# Patient Record
Sex: MALE | Race: WHITE | Employment: FULL TIME | URBAN - METROPOLITAN AREA
[De-identification: names, ages, dates, MRNs, and addresses within clinical notes are randomized per-mention and may not be internally consistent; named-entity substitution may affect disease eponyms.]

---

## 2017-03-13 PROBLEM — M25.552 LEFT HIP PAIN: Status: ACTIVE | Noted: 2017-03-13

## 2020-12-29 ENCOUNTER — HOSPITAL ENCOUNTER (EMERGENCY)
Age: 25
Discharge: HOME | End: 2020-12-29
Attending: EMERGENCY MEDICINE
Payer: COMMERCIAL

## 2020-12-29 ENCOUNTER — APPOINTMENT (OUTPATIENT)
Dept: GENERAL RADIOLOGY | Age: 25
End: 2020-12-29
Payer: COMMERCIAL

## 2020-12-29 ENCOUNTER — NURSE TRIAGE (OUTPATIENT)
Dept: OTHER | Facility: CLINIC | Age: 25
End: 2020-12-29

## 2020-12-29 ENCOUNTER — APPOINTMENT (OUTPATIENT)
Dept: CT IMAGING | Age: 25
End: 2020-12-29
Payer: COMMERCIAL

## 2020-12-29 VITALS
RESPIRATION RATE: 18 BRPM | TEMPERATURE: 98 F | HEIGHT: 67 IN | SYSTOLIC BLOOD PRESSURE: 138 MMHG | BODY MASS INDEX: 22.76 KG/M2 | DIASTOLIC BLOOD PRESSURE: 85 MMHG | HEART RATE: 99 BPM | WEIGHT: 145 LBS | OXYGEN SATURATION: 98 %

## 2020-12-29 DIAGNOSIS — Z20.822 SUSPECTED COVID-19 VIRUS INFECTION: Primary | ICD-10-CM

## 2020-12-29 DIAGNOSIS — R19.7 DIARRHEA, UNSPECIFIED TYPE: ICD-10-CM

## 2020-12-29 LAB
ALBUMIN SERPL-MCNC: 5.4 G/DL (ref 3.5–5.2)
ALP BLD-CCNC: 101 U/L (ref 40–129)
ALT SERPL-CCNC: 21 U/L (ref 0–40)
ANION GAP SERPL CALCULATED.3IONS-SCNC: 15 MMOL/L (ref 7–16)
AST SERPL-CCNC: 20 U/L (ref 0–39)
BASOPHILS ABSOLUTE: 0.02 E9/L (ref 0–0.2)
BASOPHILS RELATIVE PERCENT: 0.4 % (ref 0–2)
BILIRUB SERPL-MCNC: 1.8 MG/DL (ref 0–1.2)
BUN BLDV-MCNC: 8 MG/DL (ref 6–20)
CALCIUM SERPL-MCNC: 10.4 MG/DL (ref 8.6–10.2)
CHLORIDE BLD-SCNC: 103 MMOL/L (ref 98–107)
CO2: 21 MMOL/L (ref 22–29)
CREAT SERPL-MCNC: 1 MG/DL (ref 0.7–1.2)
D DIMER: 992 NG/ML DDU
EKG ATRIAL RATE: 84 BPM
EKG P AXIS: 51 DEGREES
EKG P-R INTERVAL: 164 MS
EKG Q-T INTERVAL: 306 MS
EKG QRS DURATION: 86 MS
EKG QTC CALCULATION (BAZETT): 361 MS
EKG R AXIS: 91 DEGREES
EKG T AXIS: 69 DEGREES
EKG VENTRICULAR RATE: 84 BPM
EOSINOPHILS ABSOLUTE: 0.01 E9/L (ref 0.05–0.5)
EOSINOPHILS RELATIVE PERCENT: 0.2 % (ref 0–6)
GFR AFRICAN AMERICAN: >60
GFR NON-AFRICAN AMERICAN: >60 ML/MIN/1.73
GLUCOSE BLD-MCNC: 103 MG/DL (ref 74–99)
HCT VFR BLD CALC: 48.4 % (ref 37–54)
HEMOGLOBIN: 17.4 G/DL (ref 12.5–16.5)
IMMATURE GRANULOCYTES #: 0 E9/L
IMMATURE GRANULOCYTES %: 0 % (ref 0–5)
INR BLD: 1
LYMPHOCYTES ABSOLUTE: 1.03 E9/L (ref 1.5–4)
LYMPHOCYTES RELATIVE PERCENT: 23.1 % (ref 20–42)
MCH RBC QN AUTO: 28.9 PG (ref 26–35)
MCHC RBC AUTO-ENTMCNC: 36 % (ref 32–34.5)
MCV RBC AUTO: 80.4 FL (ref 80–99.9)
MONOCYTES ABSOLUTE: 0.43 E9/L (ref 0.1–0.95)
MONOCYTES RELATIVE PERCENT: 9.7 % (ref 2–12)
NEUTROPHILS ABSOLUTE: 2.96 E9/L (ref 1.8–7.3)
NEUTROPHILS RELATIVE PERCENT: 66.6 % (ref 43–80)
PDW BLD-RTO: 11.7 FL (ref 11.5–15)
PLATELET # BLD: 284 E9/L (ref 130–450)
PMV BLD AUTO: 9.8 FL (ref 7–12)
POTASSIUM REFLEX MAGNESIUM: 3.6 MMOL/L (ref 3.5–5)
PRO-BNP: 7 PG/ML (ref 0–125)
PROTHROMBIN TIME: 11.9 SEC (ref 9.3–12.4)
RBC # BLD: 6.02 E12/L (ref 3.8–5.8)
SODIUM BLD-SCNC: 139 MMOL/L (ref 132–146)
TOTAL PROTEIN: 8.3 G/DL (ref 6.4–8.3)
TROPONIN: <0.01 NG/ML (ref 0–0.03)
WBC # BLD: 4.5 E9/L (ref 4.5–11.5)

## 2020-12-29 PROCEDURE — 85025 COMPLETE CBC W/AUTO DIFF WBC: CPT

## 2020-12-29 PROCEDURE — 80053 COMPREHEN METABOLIC PANEL: CPT

## 2020-12-29 PROCEDURE — 6360000004 HC RX CONTRAST MEDICATION: Performed by: RADIOLOGY

## 2020-12-29 PROCEDURE — 93005 ELECTROCARDIOGRAM TRACING: CPT | Performed by: EMERGENCY MEDICINE

## 2020-12-29 PROCEDURE — 2580000003 HC RX 258: Performed by: RADIOLOGY

## 2020-12-29 PROCEDURE — 85378 FIBRIN DEGRADE SEMIQUANT: CPT

## 2020-12-29 PROCEDURE — 6370000000 HC RX 637 (ALT 250 FOR IP): Performed by: STUDENT IN AN ORGANIZED HEALTH CARE EDUCATION/TRAINING PROGRAM

## 2020-12-29 PROCEDURE — 99285 EMERGENCY DEPT VISIT HI MDM: CPT

## 2020-12-29 PROCEDURE — 83880 ASSAY OF NATRIURETIC PEPTIDE: CPT

## 2020-12-29 PROCEDURE — 2580000003 HC RX 258: Performed by: EMERGENCY MEDICINE

## 2020-12-29 PROCEDURE — 71275 CT ANGIOGRAPHY CHEST: CPT

## 2020-12-29 PROCEDURE — U0003 INFECTIOUS AGENT DETECTION BY NUCLEIC ACID (DNA OR RNA); SEVERE ACUTE RESPIRATORY SYNDROME CORONAVIRUS 2 (SARS-COV-2) (CORONAVIRUS DISEASE [COVID-19]), AMPLIFIED PROBE TECHNIQUE, MAKING USE OF HIGH THROUGHPUT TECHNOLOGIES AS DESCRIBED BY CMS-2020-01-R: HCPCS

## 2020-12-29 PROCEDURE — 85610 PROTHROMBIN TIME: CPT

## 2020-12-29 PROCEDURE — 71045 X-RAY EXAM CHEST 1 VIEW: CPT

## 2020-12-29 PROCEDURE — 84484 ASSAY OF TROPONIN QUANT: CPT

## 2020-12-29 RX ORDER — ALBUTEROL SULFATE 90 UG/1
2 AEROSOL, METERED RESPIRATORY (INHALATION) EVERY 4 HOURS PRN
Qty: 1 INHALER | Refills: 1 | Status: SHIPPED | OUTPATIENT
Start: 2020-12-29 | End: 2021-12-29

## 2020-12-29 RX ORDER — METHYLPREDNISOLONE 4 MG/1
TABLET ORAL
Qty: 1 KIT | Status: SHIPPED | OUTPATIENT
Start: 2020-12-29 | End: 2021-01-04

## 2020-12-29 RX ORDER — 0.9 % SODIUM CHLORIDE 0.9 %
1000 INTRAVENOUS SOLUTION INTRAVENOUS ONCE
Status: COMPLETED | OUTPATIENT
Start: 2020-12-29 | End: 2020-12-29

## 2020-12-29 RX ORDER — GUAIFENESIN AND DEXTROMETHORPHAN HYDROBROMIDE 1200; 60 MG/1; MG/1
1 TABLET, EXTENDED RELEASE ORAL EVERY 12 HOURS PRN
Qty: 28 TABLET | Refills: 0 | Status: SHIPPED | OUTPATIENT
Start: 2020-12-29

## 2020-12-29 RX ORDER — ONDANSETRON 4 MG/1
4 TABLET, ORALLY DISINTEGRATING ORAL EVERY 8 HOURS PRN
Qty: 24 TABLET | Refills: 0 | Status: SHIPPED | OUTPATIENT
Start: 2020-12-29

## 2020-12-29 RX ORDER — ACETAMINOPHEN 325 MG/1
650 TABLET ORAL ONCE
Status: COMPLETED | OUTPATIENT
Start: 2020-12-29 | End: 2020-12-29

## 2020-12-29 RX ORDER — SODIUM CHLORIDE 0.9 % (FLUSH) 0.9 %
10 SYRINGE (ML) INJECTION PRN
Status: DISCONTINUED | OUTPATIENT
Start: 2020-12-29 | End: 2020-12-29 | Stop reason: HOSPADM

## 2020-12-29 RX ORDER — LOPERAMIDE HYDROCHLORIDE 2 MG/1
2 CAPSULE ORAL 4 TIMES DAILY PRN
Qty: 30 CAPSULE | Refills: 0 | Status: SHIPPED | OUTPATIENT
Start: 2020-12-29 | End: 2021-01-08

## 2020-12-29 RX ADMIN — IOPAMIDOL 75 ML: 755 INJECTION, SOLUTION INTRAVENOUS at 08:59

## 2020-12-29 RX ADMIN — Medication 10 ML: at 08:59

## 2020-12-29 RX ADMIN — ACETAMINOPHEN 650 MG: 325 TABLET ORAL at 07:50

## 2020-12-29 RX ADMIN — SODIUM CHLORIDE 1000 ML: 9 INJECTION, SOLUTION INTRAVENOUS at 07:50

## 2020-12-29 ASSESSMENT — ENCOUNTER SYMPTOMS
COUGH: 1
ABDOMINAL PAIN: 0
NAUSEA: 0
SHORTNESS OF BREATH: 1
PHOTOPHOBIA: 0
DIARRHEA: 1

## 2020-12-29 ASSESSMENT — PAIN SCALES - GENERAL: PAINLEVEL_OUTOF10: 0

## 2020-12-29 NOTE — ED PROVIDER NOTES
78-year-old male with remote history of childhood asthma, not on inhalers, presenting with chief complaint of shortness of breath. Symptoms started Friday, worsened by exertion, relieved with rest.  He also complains of chest tightness when he is feeling short of breath as well. He has had a low-grade fever up to 99.9F as well as some mild congestion and intermittent cough occasionally productive of dark yellow sputum. He had some nonbloody diarrhea yesterday. Currently, patient states he is comfortable and is not having any shortness of breath. Of note, his fiancée, who he lives with, tested positive for Covid approximately 1 week ago. He denies chest pain, nausea, emesis, dysuria, headache, dizziness, and lightheadedness. The history is provided by the patient. Review of Systems   Constitutional: Positive for fever. HENT: Positive for congestion. Eyes: Negative for photophobia and visual disturbance. Respiratory: Positive for cough and shortness of breath. Cardiovascular: Negative for chest pain and leg swelling. Gastrointestinal: Positive for diarrhea. Negative for abdominal pain and nausea. Genitourinary: Negative for dysuria and flank pain. Musculoskeletal: Negative for neck pain and neck stiffness. Skin: Negative for rash and wound. Neurological: Negative for dizziness, light-headedness and headaches. Physical Exam  Constitutional:       General: He is not in acute distress. Appearance: He is well-developed and normal weight. HENT:      Head: Normocephalic. Eyes:      Extraocular Movements: Extraocular movements intact. Neck:      Musculoskeletal: Neck supple. Cardiovascular:      Rate and Rhythm: Normal rate and regular rhythm. Pulmonary:      Effort: Pulmonary effort is normal.      Breath sounds: Normal breath sounds. Abdominal:      Palpations: Abdomen is soft. Tenderness: There is no abdominal tenderness.    Musculoskeletal:      Right lower leg: No edema. Left lower leg: No edema. Skin:     General: Skin is warm and dry. Neurological:      General: No focal deficit present. Mental Status: He is alert. Procedures     MDM  Number of Diagnoses or Management Options  Diarrhea, unspecified type  Suspected COVID-19 virus infection  Diagnosis management comments: 24-year-old male with remote history of childhood asthma, not on inhalers, presenting with chief complaint of shortness of breath. Temp 99.4F on arrival. EKG showed no acute process. Troponin negative. CBC unremarkable. CMP showed slightly elevated bilirubin of 1.8, but alk phos, ALT, and AST WNL. D-dimer elevated at 992. CXR normal CTA showed no PE or pulmonary disease. COVID swab obtained, results pending. Patient was not hypoxic at any time in the ED. Temp decreased with tylenol. He was discharged in stable condition with albuterol, Mucinex-DM, zofran, medrol-kika, and loperamide with instructions to quarantine until COVID test results and to follow up with PCP and to return to the ED if symptoms worsen. ED Course as of Dec 31 1906   Tue Dec 29, 2020   0715 EKG: Normal sinus rhythm; Rate 84; No acute process    [AP]   0811 Pt sitting in bed comfortably. NAD. D-dimer elevated. CTA ordered. Discussed this with patient, who is agreeable to plan. [AP]      ED Course User Index  [AP] Henok Ramirez MD          ED Course as of Dec 31 1906   Tue Dec 29, 2020   0715 EKG: Normal sinus rhythm; Rate 84; No acute process    [AP]   0751 Pt sitting in bed comfortably. NAD. D-dimer elevated. CTA ordered. Discussed this with patient, who is agreeable to plan. [AP]      ED Course User Index  [AP] Henok Ramirez MD       --------------------------------------------- PAST HISTORY ---------------------------------------------  Past Medical History:  has a past medical history of Asthma, Bee sting allergy, and Seasonal allergies.     Past Surgical History:  has a past surgical history that includes [place] tooth extraction (03/2017); Tonsillectomy; and Lung surgery. Social History:  reports that he has never smoked. He has never used smokeless tobacco. He reports current drug use. Drug: Marijuana. He reports that he does not drink alcohol. Family History: family history includes Other in his father. The patients home medications have been reviewed.     Allergies: Beesix [pyridoxine]    -------------------------------------------------- RESULTS -------------------------------------------------  Labs:  Results for orders placed or performed during the hospital encounter of 12/29/20   CBC Auto Differential   Result Value Ref Range    WBC 4.5 4.5 - 11.5 E9/L    RBC 6.02 (H) 3.80 - 5.80 E12/L    Hemoglobin 17.4 (H) 12.5 - 16.5 g/dL    Hematocrit 48.4 37.0 - 54.0 %    MCV 80.4 80.0 - 99.9 fL    MCH 28.9 26.0 - 35.0 pg    MCHC 36.0 (H) 32.0 - 34.5 %    RDW 11.7 11.5 - 15.0 fL    Platelets 211 675 - 343 E9/L    MPV 9.8 7.0 - 12.0 fL    Neutrophils % 66.6 43.0 - 80.0 %    Immature Granulocytes % 0.0 0.0 - 5.0 %    Lymphocytes % 23.1 20.0 - 42.0 %    Monocytes % 9.7 2.0 - 12.0 %    Eosinophils % 0.2 0.0 - 6.0 %    Basophils % 0.4 0.0 - 2.0 %    Neutrophils Absolute 2.96 1.80 - 7.30 E9/L    Immature Granulocytes # 0.00 E9/L    Lymphocytes Absolute 1.03 (L) 1.50 - 4.00 E9/L    Monocytes Absolute 0.43 0.10 - 0.95 E9/L    Eosinophils Absolute 0.01 (L) 0.05 - 0.50 E9/L    Basophils Absolute 0.02 0.00 - 0.20 E9/L   Covid-19 Ambulatory   Result Value Ref Range    Source OP swab    EKG 12 Lead   Result Value Ref Range    Ventricular Rate 84 BPM    Atrial Rate 84 BPM    P-R Interval 164 ms    QRS Duration 86 ms    Q-T Interval 306 ms    QTc Calculation (Bazett) 361 ms    P Axis 51 degrees    R Axis 91 degrees    T Axis 69 degrees       Radiology:  XR CHEST PORTABLE    (Results Pending)       ------------------------- NURSING NOTES AND VITALS REVIEWED ---------------------------  Date / Time Roomed: 12/29/2020  6:04 AM  ED Bed Assignment:  08/08    The nursing notes within the ED encounter and vital signs as below have been reviewed. BP (!) 147/78   Pulse 104   Temp 99.4 °F (37.4 °C) (Oral)   Resp 18   Ht 5' 7\" (1.702 m)   Wt 145 lb (65.8 kg)   SpO2 97%   BMI 22.71 kg/m²   Oxygen Saturation Interpretation: Normal      ------------------------------------------ PROGRESS NOTES ------------------------------------------  7:29 AM EST  I have spoken with the patient and discussed todays results, in addition to providing specific details for the plan of care and counseling regarding the diagnosis and prognosis. Their questions are answered at this time and they are agreeable with the plan. I discussed at length with them reasons for immediate return here for re evaluation. They will followup with their primary care physician by calling their office tomorrow. --------------------------------- ADDITIONAL PROVIDER NOTES ---------------------------------  At this time the patient is without objective evidence of an acute process requiring hospitalization or inpatient management. They have remained hemodynamically stable throughout their entire ED visit and are stable for discharge with outpatient follow-up. The plan has been discussed in detail and they are aware of the specific conditions for emergent return, as well as the importance of follow-up.       Discharge Medication List as of 12/29/2020  9:24 AM      START taking these medications    Details   albuterol sulfate HFA (PROVENTIL HFA) 108 (90 Base) MCG/ACT inhaler Inhale 2 puffs into the lungs every 4 hours as needed for Wheezing, Disp-1 Inhaler, R-1Normal      methylPREDNISolone (MEDROL, TANISHA,) 4 MG tablet Take by mouth., Disp-1 kit, R-zeroPrint      Dextromethorphan-guaiFENesin (MUCINEX DM MAXIMUM STRENGTH)  MG TB12 Take 1 tablet by mouth every 12 hours as needed (cough and congestion), Disp-28 tablet, R-0Normal      ondansetron (ZOFRAN ODT) 4 MG disintegrating tablet Take 1 tablet by mouth every 8 hours as needed for Nausea or Vomiting, Disp-24 tablet, R-0Normal      loperamide (RA ANTI-DIARRHEAL) 2 MG capsule Take 1 capsule by mouth 4 times daily as needed for Diarrhea, Disp-30 capsule, R-0Normal             Diagnosis:  1. Suspected COVID-19 virus infection    2. Diarrhea, unspecified type        Disposition:  Patient's disposition: Discharge to home  Patient's condition is stable.          Steven Caldera MD  Resident  12/31/20 8218

## 2020-12-29 NOTE — TELEPHONE ENCOUNTER
Reason for Disposition   MILD difficulty breathing (e.g., minimal/no SOB at rest, SOB with walking, pulse <100)   Chest pain or pressure    Answer Assessment - Initial Assessment Questions  1. COVID-19 DIAGNOSIS: \"Who made your Coronavirus (COVID-19) diagnosis? \" \"Was it confirmed by a positive lab test?\" If not diagnosed by a HCP, ask \"Are there lots of cases (community spread) where you live? \" (See public health department website, if unsure)      Unsure    2. COVID-19 EXPOSURE: \"Was there any known exposure to COVID before the symptoms began? \" [facility] Definition of close contact: within 6 feet (2 meters) for a total of 15 minutes or more over a 24-hour period. Lives with fiance    3. ONSET: \"When did the COVID-19 symptoms start? \"         Dec 25th     4. WORST SYMPTOM: \"What is your worst symptom? \" (e.g., cough, fever, shortness of breath, muscle aches)      Fatigue and shortness of breath     5. COUGH: \"Do you have a cough? \" If so, ask: \"How bad is the cough? \"         Not since Dec 25th     6. FEVER: \"Do you have a fever? \" If so, ask: \"What is your temperature, how was it measured, and when did it start? \"       No, 98.5    7. RESPIRATORY STATUS: \"Describe your breathing? \" (e.g., shortness of breath, wheezing, unable to speak)       Shortness of breath    8. BETTER-SAME-WORSE: Ness Stakes you getting better, staying the same or getting worse compared to yesterday? \"  If getting worse, ask, \"In what way? \"      Besides shortness of breath, feels better    9. HIGH RISK DISEASE: \"Do you have any chronic medical problems? \" (e.g., asthma, heart or lung disease, weak immune system, obesity, etc.)       None    10. OTHER SYMPTOMS: \"Do you have any other symptoms? \"  (e.g., chills, fatigue, headache, loss of smell or taste, muscle pain, sore throat; new loss of smell or taste especially support the diagnosis of COVID-19)    Fatigue, muscle pain, loss of smell and taste      Caller stated his fiancé tested positive for COVID. Caller has symptoms of shortness of breath that developed on the 25th, along with a runny nose and cough. Caller was informed to be evaluated at the ED and to call back for additional questions or if worsening and agreed.     Protocols used: CORONAVIRUS (COVID-19) DIAGNOSED OR SUSPECTED-ADULT-

## 2020-12-30 ENCOUNTER — CARE COORDINATION (OUTPATIENT)
Dept: CARE COORDINATION | Age: 25
End: 2020-12-30

## 2020-12-30 LAB
SARS-COV-2: DETECTED
SOURCE: ABNORMAL

## 2020-12-30 NOTE — CARE COORDINATION
-Pt returned call to [facility]. Patient contacted regarding Job Duran. Discussed COVID-19 related testing which was pending at this time. Test results were pending. Patient informed of results, if available? Yes    Care Transition Nurse/ Ambulatory Care Manager contacted the patient by telephone to perform post discharge assessment. Call within 2 business days of discharge: Yes. Verified name and  with patient as identifiers. Provided introduction to self, and explanation of the CTN/ACM role, and reason for call due to risk factors for infection and/or exposure to COVID-19. Symptoms reviewed with patient who verbalized the following symptoms: shortness of breath, loss of taste or smell, diarrhea and chest congestion. Due to no new or worsening symptoms encounter was not routed to provider for escalation. Discussed follow-up appointments. If no appointment was previously scheduled, appointment scheduling offered: Yes  [facility] follow up appointment(s): No future appointments. Non-[facility] follow up appointment(s): no  -Pt reports that he has not seen his PCP in three years. He is considering establishing with his [facility]'s PCP, Dr Gisselle Cyr. Reviewed Flu Clinics with Pt. Non-face-to-face services provided:  Reviewed AVS     Advance Care Planning:   Does patient have an Advance Directive:  decision maker updated. Patient has following risk factors of: asthma. CTN/ACM reviewed discharge instructions, medical action plan and red flags such as increased shortness of breath, increasing fever and signs of decompensation with patient who verbalized understanding. Discussed exposure protocols and quarantine with CDC Guidelines What to do if you are sick with coronavirus disease .  Patient was given an opportunity for questions and concerns.  The patient agrees to contact the [facility] exposure line [phone], local [facility] department [place] [facility]: ([phone]) and PCP office for questions related to their healthcare. CTN/ACM provided contact information for future needs. Reviewed and educated patient on any new and changed medications related to discharge diagnosis     Patient/family/caregiver given information for [facility] Loop and agrees to enroll yes  Patient's preferred e-mail: [email or web address]   Patient's preferred phone number: [phone]  Based on Loop alert triggers, patient will be contacted by nurse care manager for worsening symptoms.    -Pt reports that he is feeling the same. Temp 98.4 today. SOB w/ exertion. He used the albuterol inhaler once last night but doesn't feel he needs it today yet. Congestion is less with the Mucinex DM. Diarrhea continues, loss of taste & smell & feels exhausted. No fever, cough or nausea. Pt has not picked up the Medrol [place] yet but he said he will. He has the Albuterol inhaler, Loperamide, Zofran and Mucinex.    -Pt reports keeping hydrated but appetite is low.   -Pt is active with MyChart and he is going to monitor for the Covid test results.    -Pt accepted LOOP.  -Pt's fiance tested positive for Covid and his symptoms started three days later. Pt will be further monitored by COVID Loop Team based on severity of symptoms and risk factors.

## 2020-12-30 NOTE — CARE COORDINATION
-ACM attempted to reach patient to follow up on recent ED visit on 12- for post ED COVID-19 Monitoring, however no answer. -HIPAA compliant VM left with [facility]'s contact information, requesting call back. -If no return call, [facility] will attempt outreach again.

## 2020-12-31 ENCOUNTER — CARE COORDINATION (OUTPATIENT)
Dept: CARE COORDINATION | Age: 25
End: 2020-12-31

## 2020-12-31 NOTE — CARE COORDINATION
Yellow alert noted in Loop remote symptom monitoring program. Messaged patient to notify [place] if symptoms have worsened since yesterday. Dejah Richter, REYES, 5:50 AM  Thank you for checking in with us. Please let us know if your symptoms are worse than they were yesterday or if you wish to speak to a nurse. We are available 8am - 4pm M-F, and 9am - 1pm S/S. Dhiraj Rhodes

## 2021-04-01 ENCOUNTER — IMMUNIZATION (OUTPATIENT)
Dept: PRIMARY CARE CLINIC | Age: 26
End: 2021-04-01
Payer: COMMERCIAL

## 2021-04-01 PROCEDURE — 0011A COVID-19, MODERNA VACCINE 100MCG/0.5ML DOSE: CPT | Performed by: FAMILY MEDICINE

## 2021-04-01 PROCEDURE — 91301 COVID-19, MODERNA VACCINE 100MCG/0.5ML DOSE: CPT | Performed by: FAMILY MEDICINE

## 2021-04-29 ENCOUNTER — IMMUNIZATION (OUTPATIENT)
Dept: PRIMARY CARE CLINIC | Age: 26
End: 2021-04-29
Payer: COMMERCIAL

## 2021-04-29 PROCEDURE — 91301 COVID-19, MODERNA VACCINE 100MCG/0.5ML DOSE: CPT | Performed by: FAMILY MEDICINE

## 2021-04-29 PROCEDURE — 0012A COVID-19, MODERNA VACCINE 100MCG/0.5ML DOSE: CPT | Performed by: FAMILY MEDICINE

## 2022-05-25 ENCOUNTER — APPOINTMENT (OUTPATIENT)
Dept: CT IMAGING | Age: 27
End: 2022-05-25
Payer: COMMERCIAL

## 2022-05-25 ENCOUNTER — HOSPITAL ENCOUNTER (EMERGENCY)
Age: 27
Discharge: HOME | End: 2022-05-25
Attending: STUDENT IN AN ORGANIZED HEALTH CARE EDUCATION/TRAINING PROGRAM
Payer: COMMERCIAL

## 2022-05-25 VITALS
DIASTOLIC BLOOD PRESSURE: 80 MMHG | HEIGHT: 67 IN | HEART RATE: 76 BPM | BODY MASS INDEX: 24.33 KG/M2 | WEIGHT: 155 LBS | RESPIRATION RATE: 16 BRPM | TEMPERATURE: 97 F | OXYGEN SATURATION: 98 % | SYSTOLIC BLOOD PRESSURE: 136 MMHG

## 2022-05-25 DIAGNOSIS — R10.30 LOWER ABDOMINAL PAIN: Primary | ICD-10-CM

## 2022-05-25 LAB
ALBUMIN SERPL-MCNC: 5.1 G/DL (ref 3.5–5.2)
ALP BLD-CCNC: 97 U/L (ref 40–129)
ALT SERPL-CCNC: 19 U/L (ref 0–40)
ANION GAP SERPL CALCULATED.3IONS-SCNC: 14 MMOL/L (ref 7–16)
AST SERPL-CCNC: 19 U/L (ref 0–39)
BACTERIA: ABNORMAL /HPF
BASOPHILS ABSOLUTE: 0.07 E9/L (ref 0–0.2)
BASOPHILS RELATIVE PERCENT: 1.1 % (ref 0–2)
BILIRUB SERPL-MCNC: 1 MG/DL (ref 0–1.2)
BILIRUBIN URINE: NEGATIVE
BLOOD, URINE: NEGATIVE
BUN BLDV-MCNC: 11 MG/DL (ref 6–20)
CALCIUM SERPL-MCNC: 9.8 MG/DL (ref 8.6–10.2)
CHLORIDE BLD-SCNC: 102 MMOL/L (ref 98–107)
CLARITY: CLEAR
CO2: 22 MMOL/L (ref 22–29)
COLOR: YELLOW
CREAT SERPL-MCNC: 1.1 MG/DL (ref 0.7–1.2)
EOSINOPHILS ABSOLUTE: 0.57 E9/L (ref 0.05–0.5)
EOSINOPHILS RELATIVE PERCENT: 8.9 % (ref 0–6)
GFR AFRICAN AMERICAN: >60
GFR NON-AFRICAN AMERICAN: >60 ML/MIN/1.73
GLUCOSE BLD-MCNC: 97 MG/DL (ref 74–99)
GLUCOSE URINE: NEGATIVE MG/DL
HCT VFR BLD CALC: 42.5 % (ref 37–54)
HEMOGLOBIN: 15.1 G/DL (ref 12.5–16.5)
IMMATURE GRANULOCYTES #: 0.03 E9/L
IMMATURE GRANULOCYTES %: 0.5 % (ref 0–5)
KETONES, URINE: ABNORMAL MG/DL
LACTIC ACID, SEPSIS: 1.1 MMOL/L (ref 0.5–1.9)
LEUKOCYTE ESTERASE, URINE: NEGATIVE
LIPASE: 68 U/L (ref 13–60)
LYMPHOCYTES ABSOLUTE: 1.78 E9/L (ref 1.5–4)
LYMPHOCYTES RELATIVE PERCENT: 27.6 % (ref 20–42)
MCH RBC QN AUTO: 29 PG (ref 26–35)
MCHC RBC AUTO-ENTMCNC: 35.5 % (ref 32–34.5)
MCV RBC AUTO: 81.7 FL (ref 80–99.9)
MONOCYTES ABSOLUTE: 0.44 E9/L (ref 0.1–0.95)
MONOCYTES RELATIVE PERCENT: 6.8 % (ref 2–12)
NEUTROPHILS ABSOLUTE: 3.55 E9/L (ref 1.8–7.3)
NEUTROPHILS RELATIVE PERCENT: 55.1 % (ref 43–80)
NITRITE, URINE: NEGATIVE
PDW BLD-RTO: 12 FL (ref 11.5–15)
PH UA: 6 (ref 5–9)
PLATELET # BLD: 265 E9/L (ref 130–450)
PMV BLD AUTO: 10.3 FL (ref 7–12)
POTASSIUM REFLEX MAGNESIUM: 3.9 MMOL/L (ref 3.5–5)
PROTEIN UA: NEGATIVE MG/DL
RBC # BLD: 5.2 E12/L (ref 3.8–5.8)
RBC UA: ABNORMAL /HPF (ref 0–2)
SODIUM BLD-SCNC: 138 MMOL/L (ref 132–146)
SPECIFIC GRAVITY UA: >=1.03 (ref 1–1.03)
TOTAL PROTEIN: 7.5 G/DL (ref 6.4–8.3)
UROBILINOGEN, URINE: 0.2 E.U./DL
WBC # BLD: 6.4 E9/L (ref 4.5–11.5)
WBC UA: ABNORMAL /HPF (ref 0–5)

## 2022-05-25 PROCEDURE — 83605 ASSAY OF LACTIC ACID: CPT

## 2022-05-25 PROCEDURE — 6360000004 HC RX CONTRAST MEDICATION: Performed by: RADIOLOGY

## 2022-05-25 PROCEDURE — 99285 EMERGENCY DEPT VISIT HI MDM: CPT

## 2022-05-25 PROCEDURE — 36415 COLL VENOUS BLD VENIPUNCTURE: CPT

## 2022-05-25 PROCEDURE — 2580000003 HC RX 258: Performed by: STUDENT IN AN ORGANIZED HEALTH CARE EDUCATION/TRAINING PROGRAM

## 2022-05-25 PROCEDURE — 83690 ASSAY OF LIPASE: CPT

## 2022-05-25 PROCEDURE — 81001 URINALYSIS AUTO W/SCOPE: CPT

## 2022-05-25 PROCEDURE — 74177 CT ABD & PELVIS W/CONTRAST: CPT

## 2022-05-25 PROCEDURE — 80053 COMPREHEN METABOLIC PANEL: CPT

## 2022-05-25 PROCEDURE — 85025 COMPLETE CBC W/AUTO DIFF WBC: CPT

## 2022-05-25 RX ORDER — 0.9 % SODIUM CHLORIDE 0.9 %
1000 INTRAVENOUS SOLUTION INTRAVENOUS ONCE
Status: COMPLETED | OUTPATIENT
Start: 2022-05-25 | End: 2022-05-25

## 2022-05-25 RX ADMIN — SODIUM CHLORIDE 1000 ML: 9 INJECTION, SOLUTION INTRAVENOUS at 19:45

## 2022-05-25 RX ADMIN — IOPAMIDOL 75 ML: 755 INJECTION, SOLUTION INTRAVENOUS at 20:17

## 2022-05-25 ASSESSMENT — PAIN DESCRIPTION - LOCATION: LOCATION: ABDOMEN

## 2022-05-25 ASSESSMENT — PAIN DESCRIPTION - ORIENTATION: ORIENTATION: LEFT;LOWER

## 2022-05-25 ASSESSMENT — PAIN SCALES - GENERAL: PAINLEVEL_OUTOF10: 5

## 2022-05-25 ASSESSMENT — PAIN - FUNCTIONAL ASSESSMENT: PAIN_FUNCTIONAL_ASSESSMENT: 0-10

## 2022-05-25 NOTE — ED PROVIDER NOTES
ED  Provider Note  Admit Date/RoomTime: 5/25/2022  6:50 PM  ED Room: 19/19      History of Present Illness:  5/25/22, Time: 7:17 PM EDT  Chief Complaint   Patient presents with    Abdominal Pain     LLQ stabbing pain since saturday. took gas ex and releived the pain some. denies n/v/d         Devora Pedraza is a 27 y.o. male presenting to the ED for LLQ abd pain, started Saturday and resolved spontaneously, intermittent, two or three episodes per day, resolved with GASX, large amount of belching, worse with none, no nausea, no vomiting, no diarrhea, no intra-abdominal surgeries, no CP/SOB, c/c/r, sore throat. No bloody bms or dark stools. Review of Systems:   A complete review of systems was performed and pertinent positives and negatives are stated within HPI, all other systems reviewed and are negative.        --------------------------------------------- PATIENT HISTORY--------------------------------------------  Past Medical History:  has a past medical history of Asthma, Bee sting allergy, and Seasonal allergies. Past Surgical History:  has a past surgical history that includes [place] tooth extraction (03/2017); Tonsillectomy; and Lung surgery. Family History: family history includes Other in his father. . Unless otherwise noted, family history is non contributory    Social History:  reports that he has never smoked. He has never used smokeless tobacco. He reports current drug use. Drug: Marijuana Jerelyn November). He reports that he does not drink alcohol. The patients home medications have been reviewed.     Allergies: Beesix [pyridoxine]    I have reviewed the past medical history, past surgical history, social history, and family history    ---------------------------------------------------PHYSICAL EXAM--------------------------------------    Constitutional/General: Alert and oriented x3  Head: Normocephalic and atraumatic  Eyes: PERRL, EOMI, sclera non icteric  ENT: Oropharynx clear, handling secretions, no trismus  Neck: Supple, full ROM, no stridor, no meningismus  Respiratory: lctab  Cardiovascular: RRR, no R/G/M, 2+ peripheral pulses  Chest: No chest wall tenderness, equal chest rise  Gastrointestinal:  sntnd  Musculoskeletal: Extremities warm and well perfused, moving all extremities  Skin: skin warm and dry. No rashes. Neurologic: No focal deficits, strength and sensation grossly intact   Psychiatric: Normal Affect, behavior normal           -------------------------------------------------- RESULTS -------------------------------------------------  I have personally reviewed all laboratory and imaging results for this patient. Results are listed below.      LABS: (Lab results interpreted by me)  Results for orders placed or performed during the hospital encounter of 05/25/22   CBC with Auto Differential   Result Value Ref Range    WBC 6.4 4.5 - 11.5 E9/L    RBC 5.20 3.80 - 5.80 E12/L    Hemoglobin 15.1 12.5 - 16.5 g/dL    Hematocrit 42.5 37.0 - 54.0 %    MCV 81.7 80.0 - 99.9 fL    MCH 29.0 26.0 - 35.0 pg    MCHC 35.5 (H) 32.0 - 34.5 %    RDW 12.0 11.5 - 15.0 fL    Platelets 308 374 - 687 E9/L    MPV 10.3 7.0 - 12.0 fL    Neutrophils % 55.1 43.0 - 80.0 %    Immature Granulocytes % 0.5 0.0 - 5.0 %    Lymphocytes % 27.6 20.0 - 42.0 %    Monocytes % 6.8 2.0 - 12.0 %    Eosinophils % 8.9 (H) 0.0 - 6.0 %    Basophils % 1.1 0.0 - 2.0 %    Neutrophils Absolute 3.55 1.80 - 7.30 E9/L    Immature Granulocytes # 0.03 E9/L    Lymphocytes Absolute 1.78 1.50 - 4.00 E9/L    Monocytes Absolute 0.44 0.10 - 0.95 E9/L    Eosinophils Absolute 0.57 (H) 0.05 - 0.50 E9/L    Basophils Absolute 0.07 0.00 - 0.20 E9/L   Comprehensive Metabolic Panel w/ Reflex to MG   Result Value Ref Range    Sodium 138 132 - 146 mmol/L    Potassium reflex Magnesium 3.9 3.5 - 5.0 mmol/L    Chloride 102 98 - 107 mmol/L    CO2 22 22 - 29 mmol/L    Anion Gap 14 7 - 16 mmol/L    Glucose 97 74 - 99 mg/dL    BUN 11 6 - 20 mg/dL    CREATININE 1.1 0.7 - 1.2 mg/dL    GFR Non-African American >60 >=60 mL/min/1.73    GFR African American >60     Calcium 9.8 8.6 - 10.2 mg/dL    Total Protein 7.5 6.4 - 8.3 g/dL    Albumin 5.1 3.5 - 5.2 g/dL    Total Bilirubin 1.0 0.0 - 1.2 mg/dL    Alkaline Phosphatase 97 40 - 129 U/L    ALT 19 0 - 40 U/L    AST 19 0 - 39 U/L   Urinalysis with Microscopic   Result Value Ref Range    Color, UA Yellow Straw/Yellow    Clarity, UA Clear Clear    Glucose, Ur Negative Negative mg/dL    Bilirubin Urine Negative Negative    Ketones, Urine TRACE (A) Negative mg/dL    Specific Gravity, UA >=1.030 1.005 - 1.030    Blood, Urine Negative Negative    pH, UA 6.0 5.0 - 9.0    Protein, UA Negative Negative mg/dL    Urobilinogen, Urine 0.2 <2.0 E.U./dL    Nitrite, Urine Negative Negative    Leukocyte Esterase, Urine Negative Negative    WBC, UA NONE 0 - 5 /HPF    RBC, UA NONE 0 - 2 /HPF    Bacteria, UA NONE SEEN None Seen /HPF   Lipase   Result Value Ref Range    Lipase 68 (H) 13 - 60 U/L   Lactate, Sepsis   Result Value Ref Range    Lactic Acid, Sepsis 1.1 0.5 - 1.9 mmol/L   ,       RADIOLOGY:  Imaging interpreted by Radiologist unless otherwise specified  CT ABDOMEN PELVIS W IV CONTRAST Additional Contrast? None   Final Result   No acute abnormality is seen in the abdomen or the pelvis. RECOMMENDATIONS:   Unavailable             ------------------------- NURSING NOTES AND VITALS REVIEWED ---------------------------  The nursing notes within the ED encounter and vital signs as below have been reviewed by myself  /80   Pulse 76   Temp 97 °F (36.1 °C)   Resp 16   Ht 5' 7\" (1.702 m)   Wt 155 lb (70.3 kg)   SpO2 98%   BMI 24.28 kg/m²      The patients available past medical records and past encounters were reviewed.         ------------------------------ ED COURSE/MEDICAL DECISION MAKING----------------------  Medications   0.9 % sodium chloride bolus (0 mLs IntraVENous Stopped 5/25/22 4001)   iopamidol (ISOVUE-370) 76 % injection 75 mL (75 mLs IntraVENous Given 5/25/22 2017)       IDr. Fabio, am the primary provider of record    Medical Decision Making:   Abd pain N/V. Reassuring abd exam. Not septic or toxic in appearance. Discussed need for f/u after negative workup here in ED for possible EGD/Colonoscopy and to continue with GASX as this has been helping. Return precautions given. ED Counseling: This emergency provider has spoken with the patient and any family present to discuss clinical status, results, plan of care, diagnosis and prognosis as able to be determined at this time. Any questions were answered and patient and/or family/POA are agreeable with the plan.       --------------------------------- IMPRESSION AND DISPOSITION ---------------------------------    IMPRESSION  1. Lower abdominal pain        DISPOSITION  Disposition: Discharge to home  Patient condition is good      This report was transcribed using voice recognition software. Every effort was made to ensure accuracy; however, transcription errors may be present.          Aaliyah Patterson MD  05/26/22 0770

## 2023-04-20 ENCOUNTER — HOSPITAL ENCOUNTER (EMERGENCY)
Age: 28
Discharge: HOME | End: 2023-04-20
Payer: COMMERCIAL

## 2023-04-20 VITALS
OXYGEN SATURATION: 100 % | HEIGHT: 67 IN | TEMPERATURE: 98.6 F | HEART RATE: 71 BPM | RESPIRATION RATE: 18 BRPM | DIASTOLIC BLOOD PRESSURE: 70 MMHG | WEIGHT: 160 LBS | SYSTOLIC BLOOD PRESSURE: 116 MMHG | BODY MASS INDEX: 25.11 KG/M2

## 2023-04-20 DIAGNOSIS — T63.441A BEE STING, ACCIDENTAL OR UNINTENTIONAL, INITIAL ENCOUNTER: Primary | ICD-10-CM

## 2023-04-20 PROCEDURE — 99283 EMERGENCY DEPT VISIT LOW MDM: CPT

## 2023-04-20 PROCEDURE — 6370000000 HC RX 637 (ALT 250 FOR IP): Performed by: NURSE PRACTITIONER

## 2023-04-20 RX ORDER — METHYLPREDNISOLONE 4 MG/1
TABLET ORAL
Qty: 1 KIT | Refills: 0 | Status: SHIPPED | OUTPATIENT
Start: 2023-04-20 | End: 2023-04-26

## 2023-04-20 RX ORDER — PREDNISONE 20 MG/1
40 TABLET ORAL ONCE
Status: COMPLETED | OUTPATIENT
Start: 2023-04-20 | End: 2023-04-20

## 2023-04-20 RX ADMIN — PREDNISONE 40 MG: 20 TABLET ORAL at 18:32

## 2023-04-20 NOTE — FLOWSHEET NOTE
Pt presents to the ED secondary to a bee sting to the left hand. Pt is speaking in full sentences at this time showing no signs of distress. Pt has no further complaints at this time. Pt denies any chest pain, SOB or dizziness.

## 2023-04-20 NOTE — ED PROVIDER NOTES
wall tenderness  Integument: skin warm and dry. No rashes. Local redness and swelling to the left fifth digit consistent with a local bee sting reaction  Lymphatic: no lymphadenopathy noted  Neurologic: GCS 15, Psychiatric: Normal Affect    DIAGNOSTIC RESULTS   (All laboratory and radiology results have been personally reviewed by myself)  Labs:  No results found for this visit on 04/20/23. Imaging: All Radiology results interpreted by Radiologist unless otherwise noted. No orders to display       ED COURSE   Vitals:    Vitals:    04/20/23 1813 04/20/23 1915   BP: 118/82 116/70   Pulse: 88 71   Resp: 18 18   Temp: 98 °F (36.7 °C) 98.6 °F (37 °C)   TempSrc:  Oral   SpO2: 100% 100%   Weight: 160 lb (72.6 kg)    Height: 5' 7\" (1.702 m)        Patient was given the following medications:  Medications   predniSONE (DELTASONE) tablet 40 mg (40 mg Oral Given 4/20/23 1832)          PROCEDURES       REASSESSMENT   4/20/23       Time:   Patients condition is improving after treatment. CONSULTS:  None  DIFFERENTIAL DX_MDM   MDM:   Social Determinants : None    Records Reviewed : None_ n/a per encounter visit    CC/HPI Summary, DDx, ED Course, and Reassessment: Patient presents with Insect Bite (Bee sting left fifth digit.  swelling)  Patient presenting with a bee sting and no prior anaphylactic reaction. He was given a single dose of prednisone here and monitored with improvement in symptoms. Swelling and redness is decreasing. He was not given Benadryl due to he is driving. I do not see the need for IV Benadryl. He will however start at the local store for over-the-counter Benadryl for continued use  Plan of Care/Counseling:  MARIAH Agrawal CNP reviewed today's visit with the patient in addition to providing specific details for the plan of care and counseling regarding the diagnosis and prognosis. Questions are answered at this time and are agreeable with the plan. ASSESSMENT     1.  Bee sting,

## 2025-07-06 NOTE — DISCHARGE INSTRUCTIONS
Otc benadryl Pt demonstrates ability to turn self in bed without assistance of staff. Family understands importance in prevention of skin breakdown, ulcers, and potential infection. Hourly rounding in effect. RN skin check complete.   Devices in place include: PIV, .  Skin assessed under devices: Yes.  Confirmed HAPI identified on the following date: NA   Location of HAPI: NA.  Wound Care RN following: No.  The following interventions are in place: frequent skin assessments, diaper changes, repositioning, and ambulation as tolerated while awake.